# Patient Record
Sex: MALE | Race: OTHER | Employment: FULL TIME | URBAN - METROPOLITAN AREA
[De-identification: names, ages, dates, MRNs, and addresses within clinical notes are randomized per-mention and may not be internally consistent; named-entity substitution may affect disease eponyms.]

---

## 2019-07-18 ENCOUNTER — OFFICE VISIT (OUTPATIENT)
Dept: ORTHOPEDIC SURGERY | Age: 59
End: 2019-07-18
Payer: COMMERCIAL

## 2019-07-18 VITALS
HEART RATE: 64 BPM | DIASTOLIC BLOOD PRESSURE: 80 MMHG | BODY MASS INDEX: 31.21 KG/M2 | HEIGHT: 70 IN | SYSTOLIC BLOOD PRESSURE: 123 MMHG | WEIGHT: 218 LBS

## 2019-07-18 DIAGNOSIS — M77.01 GOLFER'S ELBOW, RIGHT: ICD-10-CM

## 2019-07-18 DIAGNOSIS — M77.01 MEDIAL EPICONDYLITIS OF RIGHT ELBOW: ICD-10-CM

## 2019-07-18 DIAGNOSIS — M25.521 RIGHT ELBOW PAIN: Primary | ICD-10-CM

## 2019-07-18 DIAGNOSIS — M19.021 PRIMARY OSTEOARTHRITIS OF RIGHT ELBOW: ICD-10-CM

## 2019-07-18 PROBLEM — K76.0 FATTY LIVER DISEASE, NONALCOHOLIC: Status: ACTIVE | Noted: 2017-04-26

## 2019-07-18 PROCEDURE — 99203 OFFICE O/P NEW LOW 30 MIN: CPT | Performed by: ORTHOPAEDIC SURGERY

## 2019-07-18 RX ORDER — LOSARTAN POTASSIUM 50 MG/1
TABLET ORAL
Refills: 0 | COMMUNITY
Start: 2019-06-16

## 2019-07-18 RX ORDER — ATORVASTATIN CALCIUM 20 MG/1
TABLET, FILM COATED ORAL
COMMUNITY
Start: 2019-06-26

## 2019-07-18 RX ORDER — NICOTINE POLACRILEX 4 MG/1
GUM, CHEWING ORAL
Refills: 0 | COMMUNITY
Start: 2019-06-16

## 2019-07-18 RX ORDER — METFORMIN HYDROCHLORIDE 500 MG/1
TABLET, EXTENDED RELEASE ORAL
COMMUNITY
Start: 2019-06-26

## 2019-07-18 SDOH — HEALTH STABILITY: MENTAL HEALTH: HOW OFTEN DO YOU HAVE A DRINK CONTAINING ALCOHOL?: NEVER

## 2019-07-18 NOTE — PROGRESS NOTES
Date:  2019    Name:  Omaira Torres  Address:  [address] [address] [address]    :  1960      Age:   60 y.o.    SSN:  [id number]      Medical Record Number:  [id number]    Reason for Visit:    Chief Complaint    Elbow Pain (OP/np right Elbow)      DOS:2019     HPI: Omaira Torres is a 60 y.o. right-handed male here today for here for consultation, evaluation and treatment of his right elbow pain. He also enjoys playing tennis. 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 He reports he has been having pain over the medial aspect of the right elbow for over the last 3 months. He has not had any treatment for this thus far. In the past years ago he has had similar issues. He saw Dr. Ulices Montgomery who performed a golfers elbow injection. The patient reports that he responded quite well to that. He denies any numbness or tingling today. Pain Assessment  Location of Pain: Elbow  Location Modifiers: Right  Severity of Pain: 6  Quality of Pain: Aching, Dull, Sharp, Throbbing  Duration of Pain: Persistent  Frequency of Pain: Constant  Aggravating Factors: (tennis)  Limiting Behavior: Yes  Result of Injury: No  Work-Related Injury: No  Are there other pain locations you wish to document?: No  ROS: All systems reviewed and otherwise negative. Pertinent items are noted in HPI. Past Medical History:   Diagnosis Date    Diabetes ([address]     HTN (hypertension)         Past Surgical History:   Procedure Laterality Date    FINGER SURGERY      GALLBLADDER SURGERY      JOINT REPLACEMENT      KNEE ARTHROSCOPY      x3    KNEE SURGERY         History reviewed. No pertinent family history.     Social History     Socioeconomic History    Marital status:      Spouse name: None    Number of children: None    Years of education: None    Highest education level: None   Occupational History    None   Social Needs    Financial resource strain: None    Food insecurity:     Worry: None Inability: None    Transportation needs:     Medical: None     Non-medical: None   Tobacco Use    Smoking status: Never Smoker    Smokeless tobacco: Never Used   Substance and Sexual Activity    Alcohol use: Never     Frequency: Never    Drug use: Never    Sexual activity: None   Lifestyle    Physical activity:     Days per week: None     Minutes per session: None    Stress: None   Relationships    Social connections:     Talks on phone: None     Gets together: None     Attends Sabianist service: None     Active member of club or organization: None     Attends meetings of clubs or organizations: None     Relationship status: None    Intimate partner violence:     Fear of current or ex partner: None     Emotionally abused: None     Physically abused: None     Forced sexual activity: None   Other Topics Concern    None   Social History Narrative    None       Current Outpatient Medications   Medication Sig Dispense Refill    omeprazole 20 MG EC tablet TAKE 1 CAPSULE BY MOUTH ONCE DAILY 30 MINUTES BEFORE A MEAL  0    metFORMIN (GLUCOPHAGE-XR) 500 MG extended release tablet Take 2 tabs daily .  losartan (COZAAR) 50 MG tablet TAKE 1 TABLET BY MOUTH AT BEDTIME  0    Cholecalciferol (VITAMIN D3) 5000 units CAPS       atorvastatin (LIPITOR) 20 MG tablet        No current facility-administered medications for this visit. No Known Allergies    Vital signs:  /80   Pulse 64   Ht 5' 10\" (1.778 m)   Wt 218 lb (98.9 kg)   BMI 31.28 kg/m²        Neuro: Alert & oriented x 3,  normal,  no focal deficits noted. Normal affect.   Eyes: sclera clear  Ears: Normal external ear  Mouth:  No perioral lesions  Pulm: Respirations unlabored and regular  Pulse: Regular rate and rhythm   Skin: Warm, well perfused    Right Elbow Examination:    Inspection:  No skin lesions, no deformity, no swelling    Palpation: There is tenderness to palpation over the medial epicondyle, there is no lateral epicondyle, of the right elbow. Plan: At this time we recommend that he stay conservative in his management. Recommend that he use ice, and do stretchs. He is unable to take any oral anti-inflammatory agents because he does have stage I kidney failure. We will see him back in 4 to 6 weeks for reevaluation. 4:20 PM      Dax Tena PA-C  Orthopaedic Sports Medicine Physician Assistant    During this examination, I, Dax Tena PA-C, functioned as a scribe for Dr. Erenest Gaucher. This dictation was performed with a verbal recognition program (DRAGON) and it was checked for errors. It is possible that there are still dictated errors within this office note. If so, please bring any errors to my attention for an addendum. All efforts were made to ensure that this office note is accurate.  _______________________  I, Dr. Erenest Gaucher, personally performed the services described in this documentation as described by Dax Tena PA-C in my presence, and it is both accurate and complete. Rudolph Tomlin MD, PhD  7/18/2019

## 2022-07-26 ENCOUNTER — OFFICE VISIT (OUTPATIENT)
Dept: ORTHOPEDIC SURGERY | Age: 62
End: 2022-07-26
Payer: COMMERCIAL

## 2022-07-26 VITALS — WEIGHT: 218 LBS | HEIGHT: 70 IN | BODY MASS INDEX: 31.21 KG/M2

## 2022-07-26 DIAGNOSIS — M25.561 ACUTE PAIN OF RIGHT KNEE: Primary | ICD-10-CM

## 2022-07-26 DIAGNOSIS — M17.11 OSTEOARTHRITIS OF RIGHT KNEE, UNSPECIFIED OSTEOARTHRITIS TYPE: ICD-10-CM

## 2022-07-26 PROCEDURE — 99213 OFFICE O/P EST LOW 20 MIN: CPT | Performed by: ORTHOPAEDIC SURGERY

## 2022-07-26 PROCEDURE — 20610 DRAIN/INJ JOINT/BURSA W/O US: CPT | Performed by: ORTHOPAEDIC SURGERY

## 2022-07-26 RX ORDER — METHYLPREDNISOLONE ACETATE 40 MG/ML
40 INJECTION, SUSPENSION INTRA-ARTICULAR; INTRALESIONAL; INTRAMUSCULAR; SOFT TISSUE ONCE
Qty: 1 ML | Refills: 0 | Status: CANCELLED | OUTPATIENT
Start: 2022-07-26 | End: 2022-07-26

## 2022-07-26 RX ORDER — METHYLPREDNISOLONE ACETATE 40 MG/ML
40 INJECTION, SUSPENSION INTRA-ARTICULAR; INTRALESIONAL; INTRAMUSCULAR; SOFT TISSUE ONCE
Status: COMPLETED | OUTPATIENT
Start: 2022-07-26 | End: 2022-07-26

## 2022-07-26 RX ADMIN — METHYLPREDNISOLONE ACETATE 40 MG: 40 INJECTION, SUSPENSION INTRA-ARTICULAR; INTRALESIONAL; INTRAMUSCULAR; SOFT TISSUE at 15:34

## 2022-07-26 NOTE — PROGRESS NOTES
[facility]  History and Physical  Knee Pain    Date:  2022    Name:  Rik Wong  Address:  [address] [address] [address]    :  1960      Age:   60 y.o.    SSN:  [id number]      Medical Record Number:  [id number]    Reason for Visit:    Knee Pain (Right knee pain 6/10, swells and locks up)      HPI:   Rik Wong is a 60y.o. year old male who presents to our office today complaining of  right knee pain. Pt is familiar to Dr. Martín Grullon and [facility] [address]. We last saw him for left knee pain in . He reports increased R knee pain about 2 months after playing tennis and going up/down stairs. Patient plays tennis 5-6 x a week. He has a past medical history of L TKA 5 years ago with Dr. Abbey Kenny in [place] that has done well. He also is Type II diabetic. He is here for further consultation. Review of Systems:  A 14 point review of systems was completed by the patient on 2022 and is available in the media section of the scanned medical record and was reviewed on 2022. The review is negative with the exception of those things mentioned in the HPI and Past Medical History. Past History:  Past Medical History:   Diagnosis Date    Diabetes ([address]     HTN (hypertension)      Past Surgical History:   Procedure Laterality Date    FINGER SURGERY      GALLBLADDER SURGERY      JOINT REPLACEMENT      KNEE ARTHROSCOPY      x3    KNEE SURGERY       Current Outpatient Medications on File Prior to Visit   Medication Sig Dispense Refill    omeprazole 20 MG EC tablet TAKE 1 CAPSULE BY MOUTH ONCE DAILY 30 MINUTES BEFORE A MEAL  0    metFORMIN (GLUCOPHAGE-XR) 500 MG extended release tablet Take 2 tabs daily . losartan (COZAAR) 50 MG tablet TAKE 1 TABLET BY MOUTH AT BEDTIME  0    Cholecalciferol (VITAMIN D3) 5000 units CAPS       atorvastatin (LIPITOR) 20 MG tablet        No current facility-administered medications on file prior to visit. Social History     Socioeconomic History    Marital status:      Spouse name: Not on file    Number of children: Not on file    Years of education: Not on file    Highest education level: Not on file   Occupational History    Not on file   Tobacco Use    Smoking status: Never    Smokeless tobacco: Never   Substance and Sexual Activity    Alcohol use: Never    Drug use: Never    Sexual activity: Not on file   Other Topics Concern    Not on file   Social History Narrative    Not on file     Social Determinants of Health     Financial Resource Strain: Not on file   Food Insecurity: Not on file   Transportation Needs: Not on file   Physical Activity: Not on file   Stress: Not on file   Social Connections: Not on file   Intimate Partner Violence: Not on file   Housing Stability: Not on file     No family history on file. Current Medications:    Current Outpatient Medications   Medication Sig Dispense Refill    omeprazole 20 MG EC tablet TAKE 1 CAPSULE BY MOUTH ONCE DAILY 30 MINUTES BEFORE A MEAL  0    metFORMIN (GLUCOPHAGE-XR) 500 MG extended release tablet Take 2 tabs daily . losartan (COZAAR) 50 MG tablet TAKE 1 TABLET BY MOUTH AT BEDTIME  0    Cholecalciferol (VITAMIN D3) 5000 units CAPS       atorvastatin (LIPITOR) 20 MG tablet        Current Facility-Administered Medications   Medication Dose Route Frequency Provider Last Rate Last Admin    methylPREDNISolone acetate (DEPO-MEDROL) injection 40 mg  40 mg IntraMUSCular Once Isidoro Balderas MD           Allergies:  No Known Allergies    Physical Exam:  There were no vitals filed for this visit. General: Jeniffer Neely is a healthy and well appearing 60y.o. year old male who is sitting comfortably in our office in no acute distress. Neuro: alert. oriented  Eyes: Extra-ocular muscles intact  Mouth: Oral mucosa moist. No perioral lesions  Pulm: Respirations unlabored and regular.   Heart: Regular rate and rhythm   Skin: warm, well perfused    Knee Examination:        RIGHT     LEFT  General:   EFFUSION:     [] NL(4) [x] Mild(3) [] Mod(2) [] Sev(1)   [x] NL(4) [] Mild(3) [] Mod(2) [] Sev(1)    TOTAL FLEX:  [] NL(4) [x] Mild(3) [] Mod(2) [] Sev(1)   [x] NL(4) [] Mild(3) [] Mod(2) [] Sev(1)    LACK of EXT:  [x] NL(4) [] Mild(3) [] Mod(2) [] Sev(1)   [x] NL(4) [] Mild(3) [] Mod(2) [] Sev(1)    QUAD WEAK: [x] NL(4) [] Mild(3) [] Mod(2) [] Sev(1)   [x] NL(4) [] Mild(3) [] Mod(2) [] Sev(1)    Points (16)  R:       L:          Tibio Femoral:       RIGHT     LEFT  JT LINE PAIN:     [] NL(4) [x] Mild(3) [] Mod(2) [] Sev(1)   [x] NL(4) [] Mild(3) [] Mod(2) [] Sev(1)    CREPITUS:        [] NL(4) [x] Mild(3) [] Mod(2) [] Sev(1)   [x] NL(4) [] Mild(3) [] Mod(2) [] Sev(1)    COMP PAIN:       [] NL(4) [x] Mild(3) [] Mod(2) [] Sev(1)   [x] NL(4) [] Mild(3) [] Mod(2) [] Sev(1)    Points (12)  R:      L:      Patello Femoral Joint:        RIGHT     LEFT  CREPITUS:                 [] NL(4) [] Mild(3) [x] Mod(2) [] Sev(1)   [x] NL(4) [] Mild(3) [] Mod(2) [] Sev(1)    COMP PAIN:               [] NL(4) [x] Mild(3) [] Mod(2) [] Sev(1)   [x] NL(4) [] Mild(3) [] Mod(2) [] Sev(1)    SOFT TISSUE PAIN:  [x] NL(4) []Mild(3) []Mod(2) [] Sev(1)  Location:    [x] NL(4) [] Mild(3) [] Mod(2) [] Sev(1)   Location:    SOFT TISSUE SWELLING:                                      [x]NL(4) []Mild(3) []Mod(2) [] Sev(1)   Location:     [x] NL(4) [] Mild(3) [] Mod(2) [] Sev(1)  Location:     Points: (16)  R:      L:         Patello Femoral Alignment:       RIGHT     LEFT  LAT. SUBLUX at 20 Degrees (%width)                                            [x]0-25 (4) []26-50 (3) []51-75 (2) [] >75 (1)    [x]0-25 (4) []26-50 (3) []51-75 (2) [] >75 (1)   MED.  SUBLUX at 20 Degrees (mm)                                            [x]15 (4) []11-15 (3) []6-10 (2) [] 0-5 (1)    [x]15 (4) []11-15 (3) []6-10 (2) [] 0-5 (1)   Q Angle at 5 Degrees                                            [x]0-15 (4) []16-20 (3) []21-25 (2) [] >25 (1)   [x]0-15 (4) []16-20 (3) []21-25 (2) [] >25 (1)   Q Angle Max at 20 Degrees                                            [x]25 (4) []30 (3) []35 (2) [] >35 (1)   [x]25 (4) []30 (3) []35 (2) [] >35 (1)   Points: (16)  R:      L:    Ligament Test:   Test   Right Left  Difference Test  Right Left Difference   Ant 25 Degrees [x] Normal       mm      mm      mm Med 0 Degrees [x] Normal       mm      mm      mm   Ant 90 Degrees [x] Normal       mm      mm      mm Med 25 Degrees [x] Normal       mm      mm      mm   P.S. (0-3) [x] Normal         Lat 0 Degrees [x] Normal       mm      mm      mm   Post 25 Degrees [x] Normal       mm      mm       mm Lat 25 Degrees [x] Normal       mm      mm      mm   Post 90 Degrees [x] Normal       mm      mm      mm ER 25 Degrees [x] Normal       deg.      deg.      deg.    RPS (0-3) [x] Normal     ER 90 Degrees [x] Normal       deg.       deg.      deg. X-ray:  Right                                                           Left  Mid Tibiofemoral:                                                          [] NL(4) []Mild(3) [x] Mod (2) [] Sev (1)     [x] NL(4) []Mild(3) [] Mod (2) [] Sev (1)    Lat Tibiofemoral:                                                         [] NL(4) [] Mild(3) [x] Mod(2) [] Sev(1)     [x] NL(4) [] Mild(3) [] Mod(2) [] Sev(1)    Patellofemoral:                               [] NL(4) [] Mild(3) [x] Mod(2) [] Sev(1)  [x] NL(4) [] Mild(3) [] Mod(2) [] Sev(1)    Alignment:                                      [x] Normal          Degrees                WBL  [x] Normal                 Degrees                WBL   Point: (12)  R:      L:            Laboratory:  No visits with results within 14 Day(s) from this visit. Latest known visit with results is:   No results found for any previous visit. No results found for this or any previous visit (from the past 24 hour(s)).     Radiographic:  3 xray views of the right knee including tunnel and merchant and one lateral view of the Right knee taken in our office today reveal no fractures, dislocations, visible tumors, or signs of acute trauma. Lateral compartment shows 1mm joint space    Radiographic Impression: Moderate to severe Lateral compartment/PF OA     Diagnosis  R Knee OA    Plan:   After evaluating the patient we believe the patient has moderate R Knee OA. We discussed at length conservative management including cortisone injections, visco supplementation, OTC medications, activity modification and possible eventual surgical intervention of a total knee replacement. We do not believe a lateral unicompartmental replacement would benefit him as they generally do not do as well as medial side. We will proceed today with a cortisone injection. After informed consent was provided, the patient was seated on the exam table with the right knee(s) flexed to 90 degrees. The anterolateral aspect of the knee adjacent to the joint line was prepped with Chlora-prep. The skin and subcutaneous tissues were anesthetized with ethyl chloride spray. A 20-gauge needle was inserted into the right knee(s) and 2 ml of 40 mg/ml DepoMedrol was injected. The needle was withdrawn and the puncture site sealed with a Band-Aid. The patient tolerated the procedure well. Post injection instructions given and any problems to notify us. All questions and concerns were addressed and answered. He will see us back as needed. I, Ely Lesches, ATC am scribing for and in the presence of Dr. Fred Henry. The physical examination was performed by Dr. Fred Henry. All counseling during the appointment was performed between the patient and Dr. Fred Henry. 07/26/22 2:57 PM   Oksana Curtis MS, ATC      This dictation was performed with a verbal recognition program [facility]) and it was checked for errors. It is possible that there are still dictated errors within this office note.   If so, please bring any errors to my attention for an addendum. All efforts were made to ensure that this office note is accurate. This patient exhibits moderate complexity for obtaining an independent history, reviewing past medical records, independent interpretation of diagnostic imaging, and further coordinating care. The patient exhibits moderate risk. Approximately 20 minutes were spent reviewing past medical records, imaging, educating the patient, and coordinating care. This dictation was performed with a verbal recognition program (DRAGON) and it was checked for errors. It is possible that there are still dictated errors within this office note. If so, please bring any errors to my attention for an addendum. All efforts were made to ensure that this office note is accurate. Supervising Physician Attestation:  I, Dr. Jing Perkins, personally performed the services described in this documentation as scribed above, and it is both accurate and complete and I agree with all pertinent clinical information. I personally interviewed the patient and performed a physical examination and medical decision making. I discussed the patient's condition and treatment options and have  reviewed and agree with the past medical, family and social history unless otherwise noted. All of the patient's questions were answered.       Board Certified Orthopaedic Surgeon  [facility] and [address] and [address] [employer]  Professor of [address]